# Patient Record
Sex: MALE | Race: WHITE | NOT HISPANIC OR LATINO | URBAN - METROPOLITAN AREA
[De-identification: names, ages, dates, MRNs, and addresses within clinical notes are randomized per-mention and may not be internally consistent; named-entity substitution may affect disease eponyms.]

---

## 2017-06-12 ENCOUNTER — OUTPATIENT (OUTPATIENT)
Dept: OUTPATIENT SERVICES | Facility: HOSPITAL | Age: 37
LOS: 1 days | Discharge: HOME | End: 2017-06-12

## 2017-06-28 DIAGNOSIS — R10.84 GENERALIZED ABDOMINAL PAIN: ICD-10-CM

## 2017-06-30 ENCOUNTER — OUTPATIENT (OUTPATIENT)
Dept: OUTPATIENT SERVICES | Facility: HOSPITAL | Age: 37
LOS: 1 days | Discharge: HOME | End: 2017-06-30

## 2017-07-05 DIAGNOSIS — R89.7 ABNORMAL HISTOLOGICAL FINDINGS IN SPECIMENS FROM OTHER ORGANS, SYSTEMS AND TISSUES: ICD-10-CM

## 2017-11-26 ENCOUNTER — TRANSCRIPTION ENCOUNTER (OUTPATIENT)
Age: 37
End: 2017-11-26

## 2019-09-30 ENCOUNTER — OUTPATIENT (OUTPATIENT)
Dept: OUTPATIENT SERVICES | Facility: HOSPITAL | Age: 39
LOS: 1 days | Discharge: HOME | End: 2019-09-30
Payer: MEDICAID

## 2019-09-30 DIAGNOSIS — R06.02 SHORTNESS OF BREATH: ICD-10-CM

## 2019-09-30 PROCEDURE — 71048 X-RAY EXAM CHEST 4+ VIEWS: CPT | Mod: 26

## 2023-01-26 ENCOUNTER — EMERGENCY (EMERGENCY)
Facility: HOSPITAL | Age: 43
LOS: 0 days | Discharge: HOME | End: 2023-01-26
Attending: EMERGENCY MEDICINE | Admitting: EMERGENCY MEDICINE
Payer: MEDICAID

## 2023-01-26 ENCOUNTER — OUTPATIENT (OUTPATIENT)
Dept: OUTPATIENT SERVICES | Facility: HOSPITAL | Age: 43
LOS: 1 days | Discharge: HOME | End: 2023-01-26

## 2023-01-26 ENCOUNTER — APPOINTMENT (OUTPATIENT)
Dept: OPHTHALMOLOGY | Facility: CLINIC | Age: 43
End: 2023-01-26
Payer: MEDICAID

## 2023-01-26 VITALS
SYSTOLIC BLOOD PRESSURE: 134 MMHG | TEMPERATURE: 98 F | DIASTOLIC BLOOD PRESSURE: 63 MMHG | RESPIRATION RATE: 16 BRPM | HEART RATE: 63 BPM | OXYGEN SATURATION: 100 %

## 2023-01-26 DIAGNOSIS — H16.021: ICD-10-CM

## 2023-01-26 DIAGNOSIS — F17.200 NICOTINE DEPENDENCE, UNSPECIFIED, UNCOMPLICATED: ICD-10-CM

## 2023-01-26 DIAGNOSIS — Z88.0 ALLERGY STATUS TO PENICILLIN: ICD-10-CM

## 2023-01-26 DIAGNOSIS — H18.891 OTHER SPECIFIED DISORDERS OF CORNEA, RIGHT EYE: ICD-10-CM

## 2023-01-26 DIAGNOSIS — Z20.822 CONTACT WITH AND (SUSPECTED) EXPOSURE TO COVID-19: ICD-10-CM

## 2023-01-26 DIAGNOSIS — Z23 ENCOUNTER FOR IMMUNIZATION: ICD-10-CM

## 2023-01-26 PROBLEM — Z00.00 ENCOUNTER FOR PREVENTIVE HEALTH EXAMINATION: Status: ACTIVE | Noted: 2023-01-26

## 2023-01-26 PROCEDURE — 65222 REMOVE FOREIGN BODY FROM EYE: CPT

## 2023-01-26 PROCEDURE — 99284 EMERGENCY DEPT VISIT MOD MDM: CPT | Mod: 25

## 2023-01-26 PROCEDURE — XXXXX: CPT

## 2023-01-26 RX ORDER — OFLOXACIN 0.3 %
1 DROPS OPHTHALMIC (EYE)
Qty: 2 | Refills: 0
Start: 2023-01-26 | End: 2023-01-30

## 2023-01-26 RX ORDER — TETANUS TOXOID, REDUCED DIPHTHERIA TOXOID AND ACELLULAR PERTUSSIS VACCINE, ADSORBED 5; 2.5; 8; 8; 2.5 [IU]/.5ML; [IU]/.5ML; UG/.5ML; UG/.5ML; UG/.5ML
0.5 SUSPENSION INTRAMUSCULAR ONCE
Refills: 0 | Status: COMPLETED | OUTPATIENT
Start: 2023-01-26 | End: 2023-01-26

## 2023-01-26 RX ORDER — KETOROLAC TROMETHAMINE 30 MG/ML
30 SYRINGE (ML) INJECTION ONCE
Refills: 0 | Status: DISCONTINUED | OUTPATIENT
Start: 2023-01-26 | End: 2023-01-26

## 2023-01-26 RX ADMIN — TETANUS TOXOID, REDUCED DIPHTHERIA TOXOID AND ACELLULAR PERTUSSIS VACCINE, ADSORBED 0.5 MILLILITER(S): 5; 2.5; 8; 8; 2.5 SUSPENSION INTRAMUSCULAR at 11:57

## 2023-01-26 RX ADMIN — Medication 30 MILLIGRAM(S): at 11:56

## 2023-01-26 NOTE — ED PROVIDER NOTE - CLINICAL SUMMARY MEDICAL DECISION MAKING FREE TEXT BOX
No distress.  (+) metallic corneal foreign body.  Removed with 19g needle.  No blushing on fluorescein post removal.  Tdap given.  Toradol given.  DC home with Rx Abx drops.  F/U with Ophtho.

## 2023-01-26 NOTE — ED PROVIDER NOTE - NSFOLLOWUPCLINICS_GEN_ALL_ED_FT
[facility]  Ophthalmolgy  [address], [address]  [place] [address]  Phone: [phone]  Fax:   Follow Up Time: 1-3 Days

## 2023-01-26 NOTE — ED PROVIDER NOTE - OBJECTIVE STATEMENT
42-year-old male with no significant PMH who presents with foreign body in right eye.  Patient was cutting metal 2 days ago, took off his glasses because they fogged up and has been having pain since yesterday.  No blurry vision.  Patient denies fevers, chills, decreased extraocular movement, headache, hearing changes, bleeding.

## 2023-01-26 NOTE — ED PROCEDURE NOTE - PROCEDURE DATE TIME, MLM
Unable to reach pt.  VM has not been set up.  Letter sent  Denise Huddleston, Clinical Pharmacist, CDE, CACP     26-Jan-2023 11:53

## 2023-01-26 NOTE — ED PROVIDER NOTE - PHYSICAL EXAMINATION
CONSTITUTIONAL: in no acute distress, afebrile  SKIN: Warm, dry  HEAD: Normocephalic; atraumatic  EYES: + R conjunctival injection. EOMI. PERRLA, + rust ring on R cornea, no pain with EOM, negative marcos, no other FB, VA intact  ENT: No nasal discharge; oropharynx nonerythematous; airway clear  NEURO: A&O x3, grossly unremarkable, no focal deficits

## 2023-01-26 NOTE — ED PROVIDER NOTE - NSFOLLOWUPINSTRUCTIONS_ED_ALL_ED_FT
- Please  the medication sent to your pharmacy and take as prescribed  - Please follow up with the Ophthalmologist  Our Emergency Department Referral Coordinators will be reaching out to you in the next 24-48 hours from 9:00am to 5:00pm with a follow up appointment. Please expect a phone call from the hospital in that time frame. If you do not receive a call or if you have any questions or concerns, you can reach them at   [phone]      Corneal Abrasion    The cornea is the clear covering at the front and center of the eye. This very thin tissue is made up of many layers. If a scratch or injury causes the corneal epithelium to come off, it is called a corneal abrasion. Symptoms include eye pain, difficulty keeping eye open, and light sensitivity. Do not drive or operate machinery if your eye is patched.    SEEK MEDICAL CARE IF YOU HAVE THE FOLLOWING SYMPTOMS: discharge from eyes, changes in vision, worsening of symptoms.

## 2023-01-26 NOTE — ED PROVIDER NOTE - PROGRESS NOTE DETAILS
AH - Rust ring successfully removed.  Antibiotic sent.  Patient will follow-up with below immediately.  Patient has no blurry vision.  Patient to be discharged from ED. Any available test results were discussed with patient and/or family. Verbal instructions given, including instructions to return to ED immediately for any new, worsening, or concerning symptoms. Strict return precautions given. Written discharge instructions additionally given, including follow-up plan

## 2023-01-26 NOTE — ED PROVIDER NOTE - PATIENT PORTAL LINK FT
You can access the FollowMyHealth Patient Portal offered by [facility] by registering at the following website: [email or web address]. By joining [facility]’s FollowMyHealth portal, you will also be able to view your health information using other applications (apps) compatible with our system.

## 2023-01-30 ENCOUNTER — APPOINTMENT (OUTPATIENT)
Dept: OPHTHALMOLOGY | Facility: CLINIC | Age: 43
End: 2023-01-30
Payer: MEDICAID

## 2023-01-30 ENCOUNTER — OUTPATIENT (OUTPATIENT)
Dept: OUTPATIENT SERVICES | Facility: HOSPITAL | Age: 43
LOS: 1 days | Discharge: HOME | End: 2023-01-30

## 2023-01-30 PROCEDURE — ZZZZZ: CPT

## 2023-02-02 DIAGNOSIS — T15.01XD FOREIGN BODY IN CORNEA, RIGHT EYE, SUBSEQUENT ENCOUNTER: ICD-10-CM

## 2023-02-02 DIAGNOSIS — H17.813 MINOR OPACITY OF CORNEA, BILATERAL: ICD-10-CM
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
## 2023-02-08 ENCOUNTER — APPOINTMENT (OUTPATIENT)
Dept: OPHTHALMOLOGY | Facility: CLINIC | Age: 43
End: 2023-02-08

## 2023-02-08 ENCOUNTER — APPOINTMENT (OUTPATIENT)
Age: 43
End: 2023-02-08

## 2024-05-24 ENCOUNTER — APPOINTMENT (OUTPATIENT)
Dept: ORTHOPEDIC SURGERY | Facility: CLINIC | Age: 44
End: 2024-05-24
Payer: MEDICAID

## 2024-05-24 DIAGNOSIS — M23.91 UNSPECIFIED INTERNAL DERANGEMENT OF RIGHT KNEE: ICD-10-CM

## 2024-05-24 PROCEDURE — 99203 OFFICE O/P NEW LOW 30 MIN: CPT

## 2024-05-24 PROCEDURE — 73560 X-RAY EXAM OF KNEE 1 OR 2: CPT | Mod: 50

## 2024-05-24 RX ORDER — IBUPROFEN 400 MG/1
400 TABLET, FILM COATED ORAL
Qty: 90 | Refills: 0 | Status: ACTIVE | COMMUNITY
Start: 2024-05-24 | End: 1900-01-01

## 2024-05-24 RX ORDER — FAMOTIDINE 20 MG/1
20 TABLET, FILM COATED ORAL
Qty: 60 | Refills: 2 | Status: ACTIVE | COMMUNITY
Start: 2024-05-24 | End: 1900-01-01

## 2024-05-24 NOTE — ASSESSMENT
[FreeTextEntry1] : 44-year-old gentleman pain and swelling about his knee after soccer.  Physical examination and history suggestive of a meniscal tear.  Recommend MRI to confirm diagnosis.  Will start him on NSAIDs for discomfort.  He is encouraged with self-directed physical therapy program.  Will see him back after he gets the MRI.

## 2024-05-24 NOTE — IMAGING
[de-identified] : Gentleman walks into my office no apparent distress.  Was able to get onto the exam bed without assistance.  He is relatively comfortable.  Physical examination: Right knee: Minimal synovial thickening.  Small effusion.  Negative pivot shift and Lachman test.  Abnormal Apley's and Lolis's localized somewhat in the back of his knee.  Negative patellofemoral grind test.  Patellofemoral tracking appears to be normal.  No geniculate lymph nodes or masses.  Calf soft no cords.  Knee motion 0-120 degrees at which point he has some discomfort.  Radiographs: Bilateral weightbearing knees (AP, lateral): No stigmata of arthritis.  No para-articular erosive changes.  No subchondral sclerotic changes.  No subchondral cystic changes.  No marginal osteophytes.  Normal knee exam.

## 2024-05-24 NOTE — HISTORY OF PRESENT ILLNESS
[de-identified] : 44-year-old  was playing soccer with his son kicking the ball distances about a month ago and developed some pain and stiffness about his knee after playing.  No clear traumatic injury.  This pain resolved after 2 weeks.  Return to the similar activity with less intensity and redevelop pain and about his knee and some loss of motion.  This symptom now has lasted for 20 days without any improvement.  No clear history of instability.  No prior history of knee problems.  Does have some knee discomfort when he has to kneel for a long period time as a .  Does not report a history of instability.  Past medical history: Denies all.  Regular medical follow-up.  Medications: None regular  NKDA  Social: , lives with wife and son, non-smoker, social EtOH, works as a